# Patient Record
Sex: FEMALE | Race: WHITE | URBAN - METROPOLITAN AREA
[De-identification: names, ages, dates, MRNs, and addresses within clinical notes are randomized per-mention and may not be internally consistent; named-entity substitution may affect disease eponyms.]

---

## 2020-02-07 ENCOUNTER — TELEPHONE (OUTPATIENT)
Dept: FAMILY MEDICINE CLINIC | Age: 25
End: 2020-02-07

## 2020-02-07 NOTE — TELEPHONE ENCOUNTER
[facility]- left message for patient reminder regarding appointment 2-. Instructed to bring medication/ otc bottles. Contact office with any questions or if she needs to reschedule.